# Patient Record
Sex: MALE | Race: WHITE | Employment: FULL TIME | URBAN - METROPOLITAN AREA
[De-identification: names, ages, dates, MRNs, and addresses within clinical notes are randomized per-mention and may not be internally consistent; named-entity substitution may affect disease eponyms.]

---

## 2020-07-14 ENCOUNTER — HOSPITAL ENCOUNTER (EMERGENCY)
Age: 35
Discharge: HOME | End: 2020-07-14
Attending: EMERGENCY MEDICINE
Payer: COMMERCIAL

## 2020-07-14 ENCOUNTER — APPOINTMENT (OUTPATIENT)
Dept: GENERAL RADIOLOGY | Age: 35
End: 2020-07-14
Attending: EMERGENCY MEDICINE
Payer: COMMERCIAL

## 2020-07-14 VITALS
TEMPERATURE: 98 F | HEIGHT: 72 IN | RESPIRATION RATE: 18 BRPM | BODY MASS INDEX: 30.73 KG/M2 | SYSTOLIC BLOOD PRESSURE: 146 MMHG | DIASTOLIC BLOOD PRESSURE: 91 MMHG | WEIGHT: 226.85 LBS | HEART RATE: 66 BPM | OXYGEN SATURATION: 98 %

## 2020-07-14 DIAGNOSIS — S60.459A FINGER, SUPERFICIAL FOREIGN BODY (SPLINTER), INITIAL ENCOUNTER: Primary | ICD-10-CM

## 2020-07-14 PROCEDURE — 99283 EMERGENCY DEPT VISIT LOW MDM: CPT

## 2020-07-14 PROCEDURE — 75810000121 HC INCSN/RMVL FB ANY OTHER SITE

## 2020-07-14 PROCEDURE — 73140 X-RAY EXAM OF FINGER(S): CPT

## 2020-07-14 PROCEDURE — 75810000283 HC INJECTION NERVE BLOCK

## 2020-07-14 PROCEDURE — 74011000250 HC RX REV CODE- 250: Performed by: EMERGENCY MEDICINE

## 2020-07-14 RX ORDER — AMOXICILLIN AND CLAVULANATE POTASSIUM 875; 125 MG/1; MG/1
1 TABLET, FILM COATED ORAL 2 TIMES DAILY
Qty: 20 TAB | Refills: 0 | Status: SHIPPED | OUTPATIENT
Start: 2020-07-14 | End: 2020-07-24

## 2020-07-14 RX ORDER — LIDOCAINE HYDROCHLORIDE 10 MG/ML
10 INJECTION, SOLUTION EPIDURAL; INFILTRATION; INTRACAUDAL; PERINEURAL ONCE
Status: COMPLETED | OUTPATIENT
Start: 2020-07-14 | End: 2020-07-14

## 2020-07-14 RX ADMIN — BACITRACIN, NEOMYCIN, POLYMYXIN B 1 PACKET: 400; 3.5; 5 OINTMENT TOPICAL at 20:55

## 2020-07-14 RX ADMIN — LIDOCAINE HYDROCHLORIDE 10 ML: 10 INJECTION, SOLUTION EPIDURAL; INFILTRATION; INTRACAUDAL; PERINEURAL at 20:00

## 2020-07-15 NOTE — ED PROVIDER NOTES
This is a 26-year-old male comes emergency room with chief complaint of foreign body in his left middle finger. Patient is right-hand dominant. Patient works as an . Patient states that earlier today he had a splinter go into his left middle finger at the distal interphalangeal joint. Patient tried to get out some of it without success. Patient comes in for further evaluation and treatment. Patient denies any redness or swelling to the finger. Patient denies any fever or chills. Patient denies any redness streaking up his hand and up his arm. The history is provided by the patient. No  was used. Foreign Body Removal   The current episode started 6 to 12 hours ago. Suspected object: Splinter of wood. Pertinent negatives include no fever, no hearing loss, no sore throat, no trouble swallowing, no difficulty breathing, no cough, no wheezing, no chest pain, no vomiting and no abdominal pain. Associated medical issues do not include esophageal disease. Past Medical History:   Diagnosis Date    Psychiatric disorder     depression       History reviewed. No pertinent surgical history. History reviewed. No pertinent family history.     Social History     Socioeconomic History    Marital status:      Spouse name: Not on file    Number of children: Not on file    Years of education: Not on file    Highest education level: Not on file   Occupational History    Not on file   Social Needs    Financial resource strain: Not on file    Food insecurity     Worry: Not on file     Inability: Not on file    Transportation needs     Medical: Not on file     Non-medical: Not on file   Tobacco Use    Smoking status: Never Smoker    Smokeless tobacco: Never Used   Substance and Sexual Activity    Alcohol use: Not Currently    Drug use: Not Currently    Sexual activity: Not on file   Lifestyle    Physical activity     Days per week: Not on file     Minutes per session: Not on file    Stress: Not on file   Relationships    Social connections     Talks on phone: Not on file     Gets together: Not on file     Attends [employer] service: Not on file     Active member of club or organization: Not on file     Attends meetings of clubs or organizations: Not on file     Relationship status: Not on file    Intimate partner violence     Fear of current or ex partner: Not on file     Emotionally abused: Not on file     Physically abused: Not on file     Forced sexual activity: Not on file   Other Topics Concern    Not on file   Social History Narrative    Not on file     ALLERGIES: Patient has no known allergies. Review of Systems   Constitutional: Negative for appetite change, chills, fever and unexpected weight change. HENT: Negative for ear pain, hearing loss, rhinorrhea, sore throat and trouble swallowing. Eyes: Negative for pain and visual disturbance. Respiratory: Negative for cough, chest tightness, shortness of breath and wheezing. Cardiovascular: Negative for chest pain and palpitations. Gastrointestinal: Negative for abdominal distention, abdominal pain, blood in stool and vomiting. Genitourinary: Negative for dysuria, hematuria and urgency. Musculoskeletal: Negative for back pain and myalgias. Skin: Positive for wound. Negative for rash. Neurological: Negative for dizziness, syncope, weakness and numbness. Psychiatric/Behavioral: Negative for confusion and suicidal ideas. All other systems reviewed and are negative. Vitals:    07/14/20 1949   BP: (!) 146/91   Pulse: 66   Resp: 18   Temp: 98 °F (36.7 °C)   SpO2: 98%   Weight: 102.9 kg (226 lb 13.7 oz)   Height: 6' (1.829 m)            Physical Exam  Vitals signs and nursing note reviewed. Constitutional:       General: He is not in acute distress. Appearance: Normal appearance. He is well-developed. He is not ill-appearing, toxic-appearing or diaphoretic.    HENT:      Head: Normocephalic and atraumatic. Right Ear: External ear normal.      Left Ear: External ear normal.   Eyes:      General: No scleral icterus. Right eye: No discharge. Left eye: No discharge. Extraocular Movements: Extraocular movements intact. Conjunctiva/sclera: Conjunctivae normal.      Pupils: Pupils are equal, round, and reactive to light. Neck:      Musculoskeletal: Normal range of motion and neck supple. Vascular: No JVD. Trachea: No tracheal deviation. Cardiovascular:      Rate and Rhythm: Normal rate and regular rhythm. Heart sounds: Normal heart sounds. No murmur. No friction rub. No gallop. Pulmonary:      Effort: Pulmonary effort is normal. No respiratory distress. Breath sounds: Normal breath sounds. No stridor. No decreased breath sounds, wheezing, rhonchi or rales. Chest:      Chest wall: No tenderness. Musculoskeletal: Normal range of motion. General: Tenderness and signs of injury present. Left hand: He exhibits tenderness. He exhibits normal range of motion, no bony tenderness, normal two-point discrimination, normal capillary refill, no deformity, no laceration and no swelling. Normal sensation noted. Normal strength noted. Hands:    Skin:     General: Skin is warm and dry. Capillary Refill: Capillary refill takes less than 2 seconds. Coloration: Skin is not pale. Findings: No erythema or rash. Neurological:      General: No focal deficit present. Mental Status: He is alert and oriented to person, place, and time. GCS: GCS eye subscore is 4. GCS verbal subscore is 5. GCS motor subscore is 6. Cranial Nerves: No cranial nerve deficit. Sensory: No sensory deficit. Motor: No weakness or abnormal muscle tone. Coordination: Coordination normal.      Deep Tendon Reflexes: Reflexes are normal and symmetric.  Reflexes normal.   Psychiatric:         Mood and Affect: Mood normal. Behavior: Behavior normal.         Thought Content: Thought content normal.         Judgment: Judgment normal.          MDM  Number of Diagnoses or Management Options  Finger, superficial foreign body (splinter), initial encounter:      Amount and/or Complexity of Data Reviewed  Tests in the radiology section of CPT®: ordered and reviewed    Risk of Complications, Morbidity, and/or Mortality  Presenting problems: moderate  Diagnostic procedures: low  Management options: moderate    Patient Progress  Patient progress: stable       Foreign Body Removal    Date/Time: 7/14/2020 8:30 PM  Performed by: Reza Guerrero DO  Authorized by: Reza Guerrero DO     Consent:     Consent obtained:  Verbal    Consent given by:  Patient    Risks discussed:  Bleeding, infection, nerve damage, incomplete removal and pain    Alternatives discussed:  No treatment and alternative treatment  Location:     Location:  Finger    Finger location:  L middle finger    Depth:  Subcutaneous    Tendon involvement:  None  Pre-procedure details:     Imaging:  X-ray    Neurovascular status: intact      Preparation: Patient was prepped and draped in usual sterile fashion    Anesthesia (see MAR for exact dosages): Anesthesia method:  Nerve block    Block location:  Digital block of the left middle finger    Block needle gauge:  25 G    Block technique:  5 cc injected into the palmar crease of the left hand    Block injection procedure:  Anatomic landmarks identified, introduced needle, anatomic landmarks palpated, negative aspiration for blood and incremental injection    Block outcome:  Anesthesia achieved  Procedure details:     Scalpel size:  11    Incision length:  5 mm    Localization method:  Visualized and probed    Dissection of underlying tissues: no      Bloodless field: yes      Removal mechanism:   Forceps    Foreign bodies recovered:  3    Description:  Multiple pieces of wood splinter with the longest being approximately 5 mm in length    Intact foreign body removal: yes    Post-procedure details:     Neurovascular status: intact (Except for the nerve block area.)      Confirmation:  No additional foreign bodies on visualization    Skin closure:  None    Dressing:  Antibiotic ointment and adhesive bandage    Patient tolerance of procedure: Tolerated well, no immediate complications (14 minutes at bedside.)  Comments:      After splinter was removed, the wound was copiously irrigated with saline as well as scrubbed prior with Betadine. Chief Complaint   Patient presents with    Foreign Body Removal       The patient's presenting problems have been discussed, and they are in agreement with the care plan formulated and outlined with them. I have encouraged them to ask questions as they arise throughout their visit. MEDICATIONS GIVEN:  Medications   neomycin-bacitracnZn-polymyxnB (NEOSPORIN) ointment 1 Packet (has no administration in time range)   lidocaine (PF) (XYLOCAINE) 10 mg/mL (1 %) injection 10 mL (10 mL IntraDERMal Given by Provider 7/14/20 2000)       LABS REVIEWED:  No results found for this or any previous visit (from the past 24 hour(s)). VITAL SIGNS:  Patient Vitals for the past 24 hrs:   Temp Pulse Resp BP SpO2   07/14/20 1949 98 °F (36.7 °C) 66 18 (!) 146/91 98 %       RADIOLOGY RESULTS:  The following have been ordered and reviewed:  Xr 3rd Finger Lt Min 2 V    Result Date: 7/14/2020  INDICATION: FB, splinter at DIP. on dorsal aspect Exam: AP, lateral, oblique views of the left third digit. FINDINGS: No acute fracture is visualized. The visualized articulations are normal. Bones are well-mineralized. No foreign body is visualized. IMPRESSION: No acute fracture or dislocation. PROCEDURES:  Foreign body removal of the left middle finger. PROGRESS NOTES:  Discussed results and plan with patient. Patient will be discharged home with Ortho hand follow up.  Patient instructed to return to the emergency room for any worsening symptoms or any other concerns. DIAGNOSIS:    1. Finger, superficial foreign body (splinter), initial encounter        PLAN:  Follow-up Information     Follow up With Specialties Details Why Contact Info    Allan Luke MD Orthopedic Surgery Schedule an appointment as soon as possible for a visit As needed [address] [place] [id number] [phone]      [facility] DEPT Emergency Medicine  If symptoms worsen Erlin [address] [place], [place] [address] [address] [phone]        Current Discharge Medication List      START taking these medications    Details   amoxicillin-clavulanate (Augmentin) 875-125 mg per tablet Take 1 Tab by mouth two (2) times a day for 10 days. Qty: 20 Tab, Refills: 0             ED COURSE: The patient's hospital course has been uncomplicated.

## 2020-07-15 NOTE — ED NOTES
Bulky dressing applied to pt's left middle finger. Discharge instructions reviewed w/ patient and/or responsible party. Follow-up information and prescriptions discussed with patient and/or responsible party. Opportunity for questions presented, and all questions answered at this time. Pt d/c at this time in stable condition.

## 2020-07-15 NOTE — DISCHARGE INSTRUCTIONS
We hope that we have addressed all of your medical concerns. The examination and treatment you received in the Emergency Department were for an emergent problem and were not intended as complete care. It is important that you follow up with your healthcare provider(s) for ongoing care. If your symptoms worsen or do not improve as expected, and you are unable to reach your usual health care provider(s), you should return to the Emergency Department. Today's healthcare is undergoing tremendous change, and patient satisfaction surveys are one of the many tools to assess the quality of medical care. You may receive a survey from the [employer] organization regarding your experience in the Emergency Department. I hope that your experience has been completely positive, particularly the medical care that I provided. As such, please participate in the survey; anything less than excellent does not meet my expectations or intentions. [facility] and [facility] participate in nationally recognized quality of care measures. If your blood pressure is greater than 120/80, as reported below, we urge that you seek medical care to address the potential of high blood pressure, commonly known as hypertension. Hypertension can be hereditary or can be caused by certain medical conditions, pain, stress, or \"white coat syndrome. \"       Please make an appointment with your health care provider(s) for follow up of your Emergency Department visit. VITALS:   Patient Vitals for the past 8 hrs:   Temp Pulse Resp BP SpO2   07/14/20 1949 98 °F (36.7 °C) 66 18 (!) 146/91 98 %          Thank you for allowing us to provide you with medical care today. We realize that you have many choices for your emergency care needs. Please choose us in the future for any continued health care needs. Ranny Church Gerrianne Lennox, [address] Office: [phone]            No results found for this or any previous visit (from the past 24 hour(s)). Xr 3rd Finger Lt Min 2 V    Result Date: 7/14/2020  INDICATION: FB, splinter at DIP. on dorsal aspect Exam: AP, lateral, oblique views of the left third digit. FINDINGS: No acute fracture is visualized. The visualized articulations are normal. Bones are well-mineralized. No foreign body is visualized. IMPRESSION: No acute fracture or dislocation.